# Patient Record
Sex: MALE | Race: WHITE
[De-identification: names, ages, dates, MRNs, and addresses within clinical notes are randomized per-mention and may not be internally consistent; named-entity substitution may affect disease eponyms.]

---

## 2018-08-30 ENCOUNTER — HOSPITAL ENCOUNTER (EMERGENCY)
Dept: HOSPITAL 72 - EMR | Age: 38
Discharge: LEFT BEFORE DISCHARGE | End: 2018-08-30
Payer: COMMERCIAL

## 2018-08-30 VITALS — SYSTOLIC BLOOD PRESSURE: 110 MMHG | DIASTOLIC BLOOD PRESSURE: 62 MMHG

## 2018-08-30 VITALS — BODY MASS INDEX: 23.24 KG/M2 | WEIGHT: 166 LBS | HEIGHT: 71 IN

## 2018-08-30 VITALS — DIASTOLIC BLOOD PRESSURE: 62 MMHG | SYSTOLIC BLOOD PRESSURE: 110 MMHG

## 2018-08-30 DIAGNOSIS — Z53.21: Primary | ICD-10-CM

## 2018-08-30 DIAGNOSIS — F32.9: ICD-10-CM

## 2018-08-30 NOTE — EMERGENCY ROOM REPORT
History of Present Illness


General


Chief Complaint:  General Complaint


Source:  Patient





Present Illness


HPI


Patient left without being seen.


Allergies:  


Coded Allergies:  


     No Known Allergies (Unverified , 5/8/16)





Patient History


Past Medical History:  see triage record


Reviewed Nursing Documentation:  PMH: Agreed; PSxH: Agreed





Nursing Documentation-PMH


Hx Asthma:  Yes - "respiratory problem"


History Of Psychiatric Problem:  Yes - bipolar


Hx Neurological Problems:  Yes - ADHD





Review of Systems


All Other Systems:  negative except mentioned in HPI





Physical Exam





Vital Signs








  Date Time  Temp Pulse Resp B/P (MAP) Pulse Ox O2 Delivery O2 Flow Rate FiO2


 


8/30/18 15:57 97.8 88 16 110/62 99 Room Air  





 97.9       








Sp02 EP Interpretation:  reviewed, normal





Medical Decision Making


PA Attestation


Dr. Henley is my supervising Physician whom patient management has been 

discussed with.


Diagnostic Impression:  


 Primary Impression:  


 Patient left without being seen


ER Course


Patient brought in by ambulance for depression, denies SI or HI per triage 

report.





Patient left without being seen.





Last Vital Signs








  Date Time  Temp Pulse Resp B/P (MAP) Pulse Ox O2 Delivery O2 Flow Rate FiO2


 


8/30/18 15:57 97.8 88 16 110/62 99 Room Air  





 97.9       








Disposition:  Werner Simpson Aug 30, 2018 16:13